# Patient Record
Sex: MALE | Race: WHITE | NOT HISPANIC OR LATINO
[De-identification: names, ages, dates, MRNs, and addresses within clinical notes are randomized per-mention and may not be internally consistent; named-entity substitution may affect disease eponyms.]

---

## 2019-04-26 PROBLEM — Z00.00 ENCOUNTER FOR PREVENTIVE HEALTH EXAMINATION: Status: ACTIVE | Noted: 2019-04-26

## 2019-05-02 ENCOUNTER — APPOINTMENT (OUTPATIENT)
Dept: BARIATRICS | Facility: CLINIC | Age: 39
End: 2019-05-02
Payer: COMMERCIAL

## 2019-05-02 VITALS
WEIGHT: 315 LBS | BODY MASS INDEX: 43.13 KG/M2 | OXYGEN SATURATION: 97 % | SYSTOLIC BLOOD PRESSURE: 130 MMHG | HEIGHT: 71.75 IN | HEART RATE: 97 BPM | DIASTOLIC BLOOD PRESSURE: 82 MMHG

## 2019-05-02 PROCEDURE — 99203 OFFICE O/P NEW LOW 30 MIN: CPT

## 2019-05-16 ENCOUNTER — APPOINTMENT (OUTPATIENT)
Dept: BARIATRICS | Facility: CLINIC | Age: 39
End: 2019-05-16

## 2019-05-16 VITALS — BODY MASS INDEX: 43.13 KG/M2 | HEIGHT: 71.75 IN | WEIGHT: 315 LBS

## 2019-05-20 ENCOUNTER — APPOINTMENT (OUTPATIENT)
Dept: BARIATRICS | Facility: CLINIC | Age: 39
End: 2019-05-20
Payer: COMMERCIAL

## 2019-05-20 VITALS — HEIGHT: 71.75 IN | WEIGHT: 315 LBS | BODY MASS INDEX: 43.13 KG/M2

## 2019-05-20 PROCEDURE — 97802 MEDICAL NUTRITION INDIV IN: CPT

## 2019-05-29 NOTE — ASSESSMENT
[FreeTextEntry1] : He meets the NIH criteria for bariatric surgery. He would like to have a laparoscopic modified duodenal switch. I have reviewed the risk and benefits of the procedure with the patient, and he understands this information fully.

## 2019-05-29 NOTE — HISTORY OF PRESENT ILLNESS
[de-identified] : Patient is a 38 year old male with a long history of morbid obesity not responsive to multiple dietary regimens. His BMI is 49.1

## 2019-06-03 ENCOUNTER — TRANSCRIPTION ENCOUNTER (OUTPATIENT)
Age: 39
End: 2019-06-03

## 2019-06-20 ENCOUNTER — APPOINTMENT (OUTPATIENT)
Dept: BARIATRICS | Facility: CLINIC | Age: 39
End: 2019-06-20
Payer: COMMERCIAL

## 2019-06-20 VITALS — WEIGHT: 315 LBS | BODY MASS INDEX: 43.13 KG/M2 | HEIGHT: 71.75 IN

## 2019-06-20 PROCEDURE — 97803 MED NUTRITION INDIV SUBSEQ: CPT

## 2019-06-24 ENCOUNTER — APPOINTMENT (OUTPATIENT)
Dept: RADIOLOGY | Facility: HOSPITAL | Age: 39
End: 2019-06-24

## 2019-07-02 ENCOUNTER — APPOINTMENT (OUTPATIENT)
Dept: BARIATRICS | Facility: CLINIC | Age: 39
End: 2019-07-02
Payer: COMMERCIAL

## 2019-07-02 VITALS
WEIGHT: 315 LBS | SYSTOLIC BLOOD PRESSURE: 129 MMHG | HEART RATE: 99 BPM | DIASTOLIC BLOOD PRESSURE: 87 MMHG | BODY MASS INDEX: 43.13 KG/M2 | OXYGEN SATURATION: 97 % | HEIGHT: 71.75 IN

## 2019-07-02 PROCEDURE — 99213 OFFICE O/P EST LOW 20 MIN: CPT

## 2019-07-02 NOTE — PHYSICAL EXAM
[Obese, well nourished, in no acute distress] : obese, well nourished, in no acute distress [Normal] : affect appropriate [de-identified] : inc c/d/i  [de-identified] : mmm, good color

## 2019-07-02 NOTE — HISTORY OF PRESENT ILLNESS
[de-identified] : 40 yo male presents for final visit for lap mod d/s.  has not lost weight.  feeling well.  no change in med hx.  nutrition quiz taken.  Denies fever, chills, n/v, cp, sob, abd pain, heartburn, diarrhea, constipation, lightheadedness, dizziness, calf pain, HA , hx of thrombosis

## 2019-07-08 VITALS
TEMPERATURE: 99 F | DIASTOLIC BLOOD PRESSURE: 78 MMHG | WEIGHT: 315 LBS | RESPIRATION RATE: 18 BRPM | HEART RATE: 78 BPM | SYSTOLIC BLOOD PRESSURE: 123 MMHG | OXYGEN SATURATION: 96 %

## 2019-07-08 NOTE — PATIENT PROFILE ADULT - ABILITY TO HEAR (WITH HEARING AID OR HEARING APPLIANCE IF NORMALLY USED):
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

## 2019-07-09 ENCOUNTER — RESULT REVIEW (OUTPATIENT)
Age: 39
End: 2019-07-09

## 2019-07-09 ENCOUNTER — INPATIENT (INPATIENT)
Facility: HOSPITAL | Age: 39
LOS: 2 days | Discharge: HOME | DRG: 621 | End: 2019-07-12
Attending: SURGERY | Admitting: SURGERY
Payer: COMMERCIAL

## 2019-07-09 ENCOUNTER — APPOINTMENT (OUTPATIENT)
Dept: BARIATRICS | Facility: HOSPITAL | Age: 39
End: 2019-07-09
Payer: COMMERCIAL

## 2019-07-09 DIAGNOSIS — Z41.9 ENCOUNTER FOR PROCEDURE FOR PURPOSES OTHER THAN REMEDYING HEALTH STATE, UNSPECIFIED: Chronic | ICD-10-CM

## 2019-07-09 LAB
HCT VFR BLD CALC: 51.8 % — HIGH (ref 39–50)
HGB BLD-MCNC: 16.7 G/DL — SIGNIFICANT CHANGE UP (ref 13–17)
MCHC RBC-ENTMCNC: 29.9 PG — SIGNIFICANT CHANGE UP (ref 27–34)
MCHC RBC-ENTMCNC: 32.2 GM/DL — SIGNIFICANT CHANGE UP (ref 32–36)
MCV RBC AUTO: 92.8 FL — SIGNIFICANT CHANGE UP (ref 80–100)
NRBC # BLD: 0 /100 WBCS — SIGNIFICANT CHANGE UP (ref 0–0)
PLATELET # BLD AUTO: 274 K/UL — SIGNIFICANT CHANGE UP (ref 150–400)
RBC # BLD: 5.58 M/UL — SIGNIFICANT CHANGE UP (ref 4.2–5.8)
RBC # FLD: 12.6 % — SIGNIFICANT CHANGE UP (ref 10.3–14.5)
WBC # BLD: 16.96 K/UL — HIGH (ref 3.8–10.5)
WBC # FLD AUTO: 16.96 K/UL — HIGH (ref 3.8–10.5)

## 2019-07-09 PROCEDURE — 43332 TRANSAB ESOPH HIAT HERN RPR: CPT | Mod: GC

## 2019-07-09 PROCEDURE — 43845 GASTROPLASTY DUODENAL SWITCH: CPT | Mod: GC

## 2019-07-09 RX ORDER — ENOXAPARIN SODIUM 100 MG/ML
40 INJECTION SUBCUTANEOUS ONCE
Refills: 0 | Status: COMPLETED | OUTPATIENT
Start: 2019-07-09 | End: 2019-07-09

## 2019-07-09 RX ORDER — HEPARIN SODIUM 5000 [USP'U]/ML
7500 INJECTION INTRAVENOUS; SUBCUTANEOUS EVERY 8 HOURS
Refills: 0 | Status: DISCONTINUED | OUTPATIENT
Start: 2019-07-09 | End: 2019-07-12

## 2019-07-09 RX ORDER — HEPARIN SODIUM 5000 [USP'U]/ML
5000 INJECTION INTRAVENOUS; SUBCUTANEOUS EVERY 8 HOURS
Refills: 0 | Status: DISCONTINUED | OUTPATIENT
Start: 2019-07-09 | End: 2019-07-09

## 2019-07-09 RX ORDER — ACETAMINOPHEN 500 MG
1000 TABLET ORAL ONCE
Refills: 0 | Status: COMPLETED | OUTPATIENT
Start: 2019-07-09 | End: 2019-07-09

## 2019-07-09 RX ORDER — OXYCODONE AND ACETAMINOPHEN 5; 325 MG/1; MG/1
1 TABLET ORAL EVERY 4 HOURS
Refills: 0 | Status: DISCONTINUED | OUTPATIENT
Start: 2019-07-09 | End: 2019-07-10

## 2019-07-09 RX ORDER — HYDROMORPHONE HYDROCHLORIDE 2 MG/ML
1 INJECTION INTRAMUSCULAR; INTRAVENOUS; SUBCUTANEOUS
Refills: 0 | Status: DISCONTINUED | OUTPATIENT
Start: 2019-07-09 | End: 2019-07-09

## 2019-07-09 RX ORDER — SCOPALAMINE 1 MG/3D
1 PATCH, EXTENDED RELEASE TRANSDERMAL ONCE
Refills: 0 | Status: COMPLETED | OUTPATIENT
Start: 2019-07-09 | End: 2019-07-09

## 2019-07-09 RX ORDER — KETOROLAC TROMETHAMINE 30 MG/ML
15 SYRINGE (ML) INJECTION EVERY 6 HOURS
Refills: 0 | Status: DISCONTINUED | OUTPATIENT
Start: 2019-07-09 | End: 2019-07-10

## 2019-07-09 RX ORDER — ONDANSETRON 8 MG/1
4 TABLET, FILM COATED ORAL EVERY 6 HOURS
Refills: 0 | Status: DISCONTINUED | OUTPATIENT
Start: 2019-07-09 | End: 2019-07-12

## 2019-07-09 RX ORDER — SIMETHICONE 80 MG/1
80 TABLET, CHEWABLE ORAL ONCE
Refills: 0 | Status: COMPLETED | OUTPATIENT
Start: 2019-07-09 | End: 2019-07-09

## 2019-07-09 RX ORDER — GABAPENTIN 400 MG/1
300 CAPSULE ORAL ONCE
Refills: 0 | Status: COMPLETED | OUTPATIENT
Start: 2019-07-09 | End: 2019-07-09

## 2019-07-09 RX ORDER — PANTOPRAZOLE SODIUM 20 MG/1
40 TABLET, DELAYED RELEASE ORAL DAILY
Refills: 0 | Status: DISCONTINUED | OUTPATIENT
Start: 2019-07-09 | End: 2019-07-12

## 2019-07-09 RX ORDER — SODIUM CHLORIDE 9 MG/ML
1000 INJECTION, SOLUTION INTRAVENOUS
Refills: 0 | Status: DISCONTINUED | OUTPATIENT
Start: 2019-07-09 | End: 2019-07-10

## 2019-07-09 RX ADMIN — ONDANSETRON 4 MILLIGRAM(S): 8 TABLET, FILM COATED ORAL at 21:18

## 2019-07-09 RX ADMIN — HEPARIN SODIUM 7500 UNIT(S): 5000 INJECTION INTRAVENOUS; SUBCUTANEOUS at 21:19

## 2019-07-09 RX ADMIN — SIMETHICONE 80 MILLIGRAM(S): 80 TABLET, CHEWABLE ORAL at 23:30

## 2019-07-09 RX ADMIN — SCOPALAMINE 1 PATCH: 1 PATCH, EXTENDED RELEASE TRANSDERMAL at 06:54

## 2019-07-09 RX ADMIN — Medication 15 MILLIGRAM(S): at 23:19

## 2019-07-09 RX ADMIN — Medication 15 MILLIGRAM(S): at 18:30

## 2019-07-09 RX ADMIN — Medication 1000 MILLIGRAM(S): at 07:22

## 2019-07-09 RX ADMIN — GABAPENTIN 300 MILLIGRAM(S): 400 CAPSULE ORAL at 07:22

## 2019-07-09 RX ADMIN — SCOPALAMINE 1 PATCH: 1 PATCH, EXTENDED RELEASE TRANSDERMAL at 18:43

## 2019-07-09 RX ADMIN — SODIUM CHLORIDE 200 MILLILITER(S): 9 INJECTION, SOLUTION INTRAVENOUS at 18:11

## 2019-07-09 RX ADMIN — ENOXAPARIN SODIUM 40 MILLIGRAM(S): 100 INJECTION SUBCUTANEOUS at 06:58

## 2019-07-09 RX ADMIN — Medication 15 MILLIGRAM(S): at 18:07

## 2019-07-09 NOTE — PROGRESS NOTE ADULT - SUBJECTIVE AND OBJECTIVE BOX
POST-OPERATIVE NOTE    Procedure:  Robotic Assisted Modified Duodenal Switch     Diagnosis/Indication: Morbid Obesity    Surgeon: Dr. Salinas    S: Pt has no complaints. Patient states he feels nauseous but denies vomiting. Denies CP, SOB, BOTELLO, calf tenderness. Pain controlled with medication. He desaturates to 88% when sleeping, currently on 3L NC    O:  T(C): 36.5 (07-09-19 @ 12:00), Max: 36.5 (07-09-19 @ 12:00)  T(F): 97.7 (07-09-19 @ 12:00), Max: 97.7 (07-09-19 @ 12:00)  HR: 86 (07-09-19 @ 13:44) (74 - 95)  BP: 107/58 (07-09-19 @ 13:44) (107/58 - 136/77)  RR: 20 (07-09-19 @ 13:44) (14 - 20)  SpO2: 95% (07-09-19 @ 13:44) (92% - 98%)  Wt(kg): --            Gen: NAD, resting comfortably in bed  C/V: NSR  Pulm: Nonlabored breathing, no respiratory distress, on 2L NC  Abd: soft, NT/ND  Extrem: WWP, no calf edema or tenderness, SCDs in place

## 2019-07-09 NOTE — BRIEF OPERATIVE NOTE - NSICDXBRIEFPROCEDURE_GEN_ALL_CORE_FT
PROCEDURES:  Robot-assisted duodenal switch 09-Jul-2019 11:47:18 modified, with hiatal hernia repair Lenard Otero S

## 2019-07-09 NOTE — BRIEF OPERATIVE NOTE - OPERATION/FINDINGS
Single anastomosis duodenal switch done with sleeve gastrectomy over 42 Fr bougie, staple line oversewn and fixed to omentum with running 2-0 PDS. Duodeno-ileostomy done at 300 cm from the terminal ileum with 2-0 PDS running sutures in 2 layers. Negative leak test. Hiatal hernia repaired with 2-0 Prolene.

## 2019-07-09 NOTE — PROGRESS NOTE ADULT - ASSESSMENT
A/P: 39y Male s/p Robotic Assisted Modified Duodenal Switch    -Pain/nausea control  -BCLD, IVF @ 200cc  -Protonix  -CBC 6hrs post-op   -HSQ  DVT ppx   -SCDs, OOB/A, IS  -AM labs  -Nutritional consult in AM  -CPAP

## 2019-07-10 ENCOUNTER — TRANSCRIPTION ENCOUNTER (OUTPATIENT)
Age: 39
End: 2019-07-10

## 2019-07-10 LAB
ANION GAP SERPL CALC-SCNC: 13 MMOL/L — SIGNIFICANT CHANGE UP (ref 5–17)
BUN SERPL-MCNC: 17 MG/DL — SIGNIFICANT CHANGE UP (ref 7–23)
CALCIUM SERPL-MCNC: 8.6 MG/DL — SIGNIFICANT CHANGE UP (ref 8.4–10.5)
CHLORIDE SERPL-SCNC: 103 MMOL/L — SIGNIFICANT CHANGE UP (ref 96–108)
CO2 SERPL-SCNC: 22 MMOL/L — SIGNIFICANT CHANGE UP (ref 22–31)
CREAT SERPL-MCNC: 1 MG/DL — SIGNIFICANT CHANGE UP (ref 0.5–1.3)
GLUCOSE SERPL-MCNC: 121 MG/DL — HIGH (ref 70–99)
HCT VFR BLD CALC: 46.5 % — SIGNIFICANT CHANGE UP (ref 39–50)
HGB BLD-MCNC: 15 G/DL — SIGNIFICANT CHANGE UP (ref 13–17)
MAGNESIUM SERPL-MCNC: 1.7 MG/DL — SIGNIFICANT CHANGE UP (ref 1.6–2.6)
MCHC RBC-ENTMCNC: 30.2 PG — SIGNIFICANT CHANGE UP (ref 27–34)
MCHC RBC-ENTMCNC: 32.3 GM/DL — SIGNIFICANT CHANGE UP (ref 32–36)
MCV RBC AUTO: 93.8 FL — SIGNIFICANT CHANGE UP (ref 80–100)
NRBC # BLD: 0 /100 WBCS — SIGNIFICANT CHANGE UP (ref 0–0)
PHOSPHATE SERPL-MCNC: 2.7 MG/DL — SIGNIFICANT CHANGE UP (ref 2.5–4.5)
PLATELET # BLD AUTO: 200 K/UL — SIGNIFICANT CHANGE UP (ref 150–400)
POTASSIUM SERPL-MCNC: 4 MMOL/L — SIGNIFICANT CHANGE UP (ref 3.5–5.3)
POTASSIUM SERPL-SCNC: 4 MMOL/L — SIGNIFICANT CHANGE UP (ref 3.5–5.3)
RBC # BLD: 4.96 M/UL — SIGNIFICANT CHANGE UP (ref 4.2–5.8)
RBC # FLD: 12.4 % — SIGNIFICANT CHANGE UP (ref 10.3–14.5)
SODIUM SERPL-SCNC: 138 MMOL/L — SIGNIFICANT CHANGE UP (ref 135–145)
SURGICAL PATHOLOGY STUDY: SIGNIFICANT CHANGE UP
WBC # BLD: 14.34 K/UL — HIGH (ref 3.8–10.5)
WBC # FLD AUTO: 14.34 K/UL — HIGH (ref 3.8–10.5)

## 2019-07-10 PROCEDURE — 43845 GASTROPLASTY DUODENAL SWITCH: CPT | Mod: GC

## 2019-07-10 PROCEDURE — 43332 TRANSAB ESOPH HIAT HERN RPR: CPT | Mod: GC

## 2019-07-10 RX ORDER — DEXAMETHASONE 0.5 MG/5ML
6 ELIXIR ORAL ONCE
Refills: 0 | Status: COMPLETED | OUTPATIENT
Start: 2019-07-10 | End: 2019-07-10

## 2019-07-10 RX ORDER — MAGNESIUM SULFATE 500 MG/ML
2 VIAL (ML) INJECTION ONCE
Refills: 0 | Status: COMPLETED | OUTPATIENT
Start: 2019-07-10 | End: 2019-07-10

## 2019-07-10 RX ORDER — METOCLOPRAMIDE HCL 10 MG
10 TABLET ORAL ONCE
Refills: 0 | Status: COMPLETED | OUTPATIENT
Start: 2019-07-10 | End: 2019-07-11

## 2019-07-10 RX ORDER — METOCLOPRAMIDE HCL 10 MG
10 TABLET ORAL ONCE
Refills: 0 | Status: COMPLETED | OUTPATIENT
Start: 2019-07-10 | End: 2019-07-10

## 2019-07-10 RX ORDER — ACETAMINOPHEN 500 MG
1000 TABLET ORAL ONCE
Refills: 0 | Status: DISCONTINUED | OUTPATIENT
Start: 2019-07-10 | End: 2019-07-12

## 2019-07-10 RX ORDER — SODIUM CHLORIDE 9 MG/ML
1000 INJECTION, SOLUTION INTRAVENOUS
Refills: 0 | Status: DISCONTINUED | OUTPATIENT
Start: 2019-07-10 | End: 2019-07-12

## 2019-07-10 RX ORDER — ACETAMINOPHEN 500 MG
1000 TABLET ORAL ONCE
Refills: 0 | Status: COMPLETED | OUTPATIENT
Start: 2019-07-10 | End: 2019-07-10

## 2019-07-10 RX ORDER — KETOROLAC TROMETHAMINE 30 MG/ML
30 SYRINGE (ML) INJECTION EVERY 6 HOURS
Refills: 0 | Status: DISCONTINUED | OUTPATIENT
Start: 2019-07-10 | End: 2019-07-12

## 2019-07-10 RX ORDER — HYDROMORPHONE HYDROCHLORIDE 2 MG/ML
0.5 INJECTION INTRAMUSCULAR; INTRAVENOUS; SUBCUTANEOUS ONCE
Refills: 0 | Status: DISCONTINUED | OUTPATIENT
Start: 2019-07-10 | End: 2019-07-10

## 2019-07-10 RX ADMIN — Medication 15 MILLIGRAM(S): at 12:50

## 2019-07-10 RX ADMIN — Medication 50 GRAM(S): at 09:58

## 2019-07-10 RX ADMIN — Medication 6 MILLIGRAM(S): at 12:31

## 2019-07-10 RX ADMIN — SCOPALAMINE 1 PATCH: 1 PATCH, EXTENDED RELEASE TRANSDERMAL at 18:56

## 2019-07-10 RX ADMIN — Medication 400 MILLIGRAM(S): at 18:41

## 2019-07-10 RX ADMIN — Medication 15 MILLIGRAM(S): at 05:58

## 2019-07-10 RX ADMIN — Medication 10 MILLIGRAM(S): at 05:42

## 2019-07-10 RX ADMIN — HEPARIN SODIUM 7500 UNIT(S): 5000 INJECTION INTRAVENOUS; SUBCUTANEOUS at 21:49

## 2019-07-10 RX ADMIN — HEPARIN SODIUM 7500 UNIT(S): 5000 INJECTION INTRAVENOUS; SUBCUTANEOUS at 05:35

## 2019-07-10 RX ADMIN — OXYCODONE AND ACETAMINOPHEN 1 TABLET(S): 5; 325 TABLET ORAL at 11:08

## 2019-07-10 RX ADMIN — Medication 30 MILLIGRAM(S): at 18:41

## 2019-07-10 RX ADMIN — HEPARIN SODIUM 7500 UNIT(S): 5000 INJECTION INTRAVENOUS; SUBCUTANEOUS at 14:20

## 2019-07-10 RX ADMIN — PANTOPRAZOLE SODIUM 40 MILLIGRAM(S): 20 TABLET, DELAYED RELEASE ORAL at 12:31

## 2019-07-10 RX ADMIN — ONDANSETRON 4 MILLIGRAM(S): 8 TABLET, FILM COATED ORAL at 11:00

## 2019-07-10 RX ADMIN — OXYCODONE AND ACETAMINOPHEN 1 TABLET(S): 5; 325 TABLET ORAL at 10:08

## 2019-07-10 RX ADMIN — ONDANSETRON 4 MILLIGRAM(S): 8 TABLET, FILM COATED ORAL at 18:41

## 2019-07-10 RX ADMIN — Medication 15 MILLIGRAM(S): at 12:32

## 2019-07-10 RX ADMIN — ONDANSETRON 4 MILLIGRAM(S): 8 TABLET, FILM COATED ORAL at 05:35

## 2019-07-10 RX ADMIN — Medication 30 MILLIGRAM(S): at 18:57

## 2019-07-10 RX ADMIN — HYDROMORPHONE HYDROCHLORIDE 0.5 MILLIGRAM(S): 2 INJECTION INTRAMUSCULAR; INTRAVENOUS; SUBCUTANEOUS at 21:50

## 2019-07-10 RX ADMIN — SODIUM CHLORIDE 100 MILLILITER(S): 9 INJECTION, SOLUTION INTRAVENOUS at 16:26

## 2019-07-10 RX ADMIN — SODIUM CHLORIDE 200 MILLILITER(S): 9 INJECTION, SOLUTION INTRAVENOUS at 10:00

## 2019-07-10 RX ADMIN — Medication 1000 MILLIGRAM(S): at 18:57

## 2019-07-10 RX ADMIN — SCOPALAMINE 1 PATCH: 1 PATCH, EXTENDED RELEASE TRANSDERMAL at 06:08

## 2019-07-10 RX ADMIN — Medication 15 MILLIGRAM(S): at 05:35

## 2019-07-10 RX ADMIN — Medication 15 MILLIGRAM(S): at 00:37

## 2019-07-10 RX ADMIN — HYDROMORPHONE HYDROCHLORIDE 0.5 MILLIGRAM(S): 2 INJECTION INTRAMUSCULAR; INTRAVENOUS; SUBCUTANEOUS at 22:25

## 2019-07-10 NOTE — DISCHARGE NOTE PROVIDER - CARE PROVIDER_API CALL
Grabiel Salinas (MD)  Surgery  [address]  [place], [place] [address]  Phone: [phone]  Fax: [phone]  Follow Up Time: 1 week

## 2019-07-10 NOTE — PROGRESS NOTE ADULT - SUBJECTIVE AND OBJECTIVE BOX
OVERNIGHT EVENTS: pain controlled, encouraged IS use and ambulating, OOBA, pt c/o of gas pain, post op h/h: 16.7/51.8, passed TOV  7/9: robotic assisted  MDS , poc wnl, VSS     STATUS POST:  Robotic assisted modified duodenal switch     POST OPERATIVE DAY #: 1    SUBJECTIVE: Patient examined bedside with chief resident. Patient complains of incisional and gas pain.  Patient complains of nausea and 2 episodes of emesis.  Patient denies SOB and chest pain . Patient making adequate urine output .    heparin  Injectable 7500 Unit(s) SubCutaneous every 8 hours      Vital Signs Last 24 Hrs  T(C): 36.6 (10 Jul 2019 08:30), Max: 37.2 (10 Jul 2019 05:47)  T(F): 97.8 (10 Jul 2019 08:30), Max: 99 (10 Jul 2019 05:47)  HR: 94 (10 Jul 2019 08:30) (70 - 116)  BP: 154/90 (10 Jul 2019 08:30) (107/58 - 154/90)  BP(mean): 78 (09 Jul 2019 13:44) (78 - 82)  RR: 18 (10 Jul 2019 08:30) (14 - 20)  SpO2: 97% (10 Jul 2019 08:30) (92% - 98%)  I&O's Detail    09 Jul 2019 07:01  -  10 Jul 2019 07:00  --------------------------------------------------------  IN:    lactated ringers.: 3000 mL  Total IN: 3000 mL    OUT:    Emesis: 75 mL    Voided: 500 mL  Total OUT: 575 mL    Total NET: 2425 mL          General: NAD, resting comfortably in bed  C/V: NSR  Pulm: Nonlabored breathing, no respiratory distress  Abd: soft, non distended, TTP around incision site, incision clean dry and intact  Extrem: WWP, no edema, SCDs in place        LABS:                        15.0   14.34 )-----------( 200      ( 10 Jul 2019 07:43 )             46.5     07-10    138  |  103  |  17  ----------------------------<  121<H>  4.0   |  22  |  1.00    Ca    8.6      10 Jul 2019 07:43  Phos  2.7     07-10  Mg     1.7     07-10            RADIOLOGY & ADDITIONAL STUDIES:

## 2019-07-10 NOTE — PROGRESS NOTE ADULT - ASSESSMENT
39 year old male with history of MO ( BMI  47) presents for elective procedure. Patient s/p laparoscopic modified duodenal switch     -Pain/nausea control  -BCLD, IVF @ 200cc  -Protonix  -HSQ  DVT ppx   -SCDs, OOB/A, IS  -AM labs  -Nutritional consult in AM

## 2019-07-10 NOTE — DISCHARGE NOTE PROVIDER - HOSPITAL COURSE
39 y ear old male  with history of morbid obesity - BMI 47, presents for elective bariatric surgery. 7/9: robotic assisted modified duodenal switch .Pt tolerated the procedure well.  At time of discharge, pt was tolerating a bariatric clear liquid diet, and pt's pain was controlled. Plan is to follow up with Dr. Salinas  in the office.

## 2019-07-10 NOTE — DISCHARGE NOTE PROVIDER - NSDCCPCAREPLAN_GEN_ALL_CORE_FT
PRINCIPAL DISCHARGE DIAGNOSIS  Diagnosis: Morbid obesity  Assessment and Plan of Treatment: 39 y ear old male  with history of morbid obesity - BMI 47, presents for elective bariatric surgery. 7/9: robotic assisted modified duodenal switch .  1) Please take Percocet 1 to 2 tabs by mouth every 4 to 6 hours as needed for severe pain control.  2) Please take Tylenol 650 mg every 4 to 6 hours by mouth for moderate pain control.   3) Please take Omeprazole 40 mg once a day by mouth.

## 2019-07-10 NOTE — DISCHARGE NOTE PROVIDER - NSDCCPGOAL_GEN_ALL_CORE_FT
To get better and follow your care plan as instructed.    1) Please take Percocet 1 to 2 tabs by mouth every 4 to 6 hours as needed for severe pain control.  2) Please take Tylenol 650 mg every 4 to 6 hours by mouth for moderate pain control.   3) Please take Omeprazole 40 mg once a day by mouth.

## 2019-07-10 NOTE — DISCHARGE NOTE PROVIDER - NSDCFUADDINST_GEN_ALL_CORE_FT
Follow up with  in 1 week. Call the office at  to schedule your appointment.  You may shower; soap and water over incision sites. Do not scrub. Pat dry when done. No tub bathing or swimming until cleared. Keep incision sites out of the sun as scars will darken. No heavy lifting (>10lbs) or strenuous exercise. Diet: Bariatric Full Fluids. 60 grams protein daily.  64 fluid ounces water daily. Drink small sips throughout the day. Continue diet as outlined by paperwork received as a pre-operative patient. You should be urinating at least 3-4x per day. Call the office if you experience increasing abdominal pain, nausea, vomiting, or temperature >100.4F.  NO ASPIRIN OR NSAIDs until approved by Dr. Salinas. Avoid alcoholic beverages until cleared by Dr. Salinas.   1) Please take Percocet 1 to 2 tabs by mouth every 4 to 6 hours as needed for severe pain control.  2) Please take Tylenol 650 mg every 4 to 6 hours by mouth for moderate pain control.   3) Please take Omeprazole 40 mg once a day by mouth.

## 2019-07-11 LAB
ANION GAP SERPL CALC-SCNC: 10 MMOL/L — SIGNIFICANT CHANGE UP (ref 5–17)
BUN SERPL-MCNC: 16 MG/DL — SIGNIFICANT CHANGE UP (ref 7–23)
CALCIUM SERPL-MCNC: 8.9 MG/DL — SIGNIFICANT CHANGE UP (ref 8.4–10.5)
CHLORIDE SERPL-SCNC: 105 MMOL/L — SIGNIFICANT CHANGE UP (ref 96–108)
CO2 SERPL-SCNC: 25 MMOL/L — SIGNIFICANT CHANGE UP (ref 22–31)
CREAT SERPL-MCNC: 0.93 MG/DL — SIGNIFICANT CHANGE UP (ref 0.5–1.3)
GLUCOSE SERPL-MCNC: 99 MG/DL — SIGNIFICANT CHANGE UP (ref 70–99)
HCT VFR BLD CALC: 42.4 % — SIGNIFICANT CHANGE UP (ref 39–50)
HGB BLD-MCNC: 13.6 G/DL — SIGNIFICANT CHANGE UP (ref 13–17)
MAGNESIUM SERPL-MCNC: 2 MG/DL — SIGNIFICANT CHANGE UP (ref 1.6–2.6)
MCHC RBC-ENTMCNC: 30.1 PG — SIGNIFICANT CHANGE UP (ref 27–34)
MCHC RBC-ENTMCNC: 32.1 GM/DL — SIGNIFICANT CHANGE UP (ref 32–36)
MCV RBC AUTO: 93.8 FL — SIGNIFICANT CHANGE UP (ref 80–100)
NRBC # BLD: 0 /100 WBCS — SIGNIFICANT CHANGE UP (ref 0–0)
PHOSPHATE SERPL-MCNC: 2.3 MG/DL — LOW (ref 2.5–4.5)
PLATELET # BLD AUTO: 197 K/UL — SIGNIFICANT CHANGE UP (ref 150–400)
POTASSIUM SERPL-MCNC: 4 MMOL/L — SIGNIFICANT CHANGE UP (ref 3.5–5.3)
POTASSIUM SERPL-SCNC: 4 MMOL/L — SIGNIFICANT CHANGE UP (ref 3.5–5.3)
RBC # BLD: 4.52 M/UL — SIGNIFICANT CHANGE UP (ref 4.2–5.8)
RBC # FLD: 12.4 % — SIGNIFICANT CHANGE UP (ref 10.3–14.5)
SODIUM SERPL-SCNC: 140 MMOL/L — SIGNIFICANT CHANGE UP (ref 135–145)
WBC # BLD: 11.05 K/UL — HIGH (ref 3.8–10.5)
WBC # FLD AUTO: 11.05 K/UL — HIGH (ref 3.8–10.5)

## 2019-07-11 RX ORDER — MAGNESIUM SULFATE 500 MG/ML
2 VIAL (ML) INJECTION ONCE
Refills: 0 | Status: COMPLETED | OUTPATIENT
Start: 2019-07-11 | End: 2019-07-11

## 2019-07-11 RX ORDER — DEXAMETHASONE 0.5 MG/5ML
6 ELIXIR ORAL ONCE
Refills: 0 | Status: COMPLETED | OUTPATIENT
Start: 2019-07-11 | End: 2019-07-11

## 2019-07-11 RX ORDER — DIPHENHYDRAMINE HCL 50 MG
25 CAPSULE ORAL ONCE
Refills: 0 | Status: COMPLETED | OUTPATIENT
Start: 2019-07-11 | End: 2019-07-11

## 2019-07-11 RX ORDER — HYDROMORPHONE HYDROCHLORIDE 2 MG/ML
0.5 INJECTION INTRAMUSCULAR; INTRAVENOUS; SUBCUTANEOUS ONCE
Refills: 0 | Status: DISCONTINUED | OUTPATIENT
Start: 2019-07-11 | End: 2019-07-11

## 2019-07-11 RX ADMIN — Medication 30 MILLIGRAM(S): at 17:38

## 2019-07-11 RX ADMIN — Medication 30 MILLIGRAM(S): at 00:37

## 2019-07-11 RX ADMIN — SCOPALAMINE 1 PATCH: 1 PATCH, EXTENDED RELEASE TRANSDERMAL at 19:05

## 2019-07-11 RX ADMIN — HEPARIN SODIUM 7500 UNIT(S): 5000 INJECTION INTRAVENOUS; SUBCUTANEOUS at 21:21

## 2019-07-11 RX ADMIN — Medication 50 GRAM(S): at 07:49

## 2019-07-11 RX ADMIN — Medication 30 MILLIGRAM(S): at 12:18

## 2019-07-11 RX ADMIN — Medication 30 MILLIGRAM(S): at 06:25

## 2019-07-11 RX ADMIN — HYDROMORPHONE HYDROCHLORIDE 0.5 MILLIGRAM(S): 2 INJECTION INTRAMUSCULAR; INTRAVENOUS; SUBCUTANEOUS at 02:39

## 2019-07-11 RX ADMIN — Medication 30 MILLIGRAM(S): at 17:23

## 2019-07-11 RX ADMIN — Medication 6 MILLIGRAM(S): at 09:26

## 2019-07-11 RX ADMIN — HYDROMORPHONE HYDROCHLORIDE 0.5 MILLIGRAM(S): 2 INJECTION INTRAMUSCULAR; INTRAVENOUS; SUBCUTANEOUS at 02:24

## 2019-07-11 RX ADMIN — Medication 30 MILLIGRAM(S): at 01:00

## 2019-07-11 RX ADMIN — Medication 25 MILLIGRAM(S): at 23:20

## 2019-07-11 RX ADMIN — Medication 30 MILLIGRAM(S): at 05:53

## 2019-07-11 RX ADMIN — ONDANSETRON 4 MILLIGRAM(S): 8 TABLET, FILM COATED ORAL at 02:24

## 2019-07-11 RX ADMIN — HEPARIN SODIUM 7500 UNIT(S): 5000 INJECTION INTRAVENOUS; SUBCUTANEOUS at 14:08

## 2019-07-11 RX ADMIN — Medication 30 MILLIGRAM(S): at 12:03

## 2019-07-11 RX ADMIN — SCOPALAMINE 1 PATCH: 1 PATCH, EXTENDED RELEASE TRANSDERMAL at 06:25

## 2019-07-11 RX ADMIN — HEPARIN SODIUM 7500 UNIT(S): 5000 INJECTION INTRAVENOUS; SUBCUTANEOUS at 05:53

## 2019-07-11 RX ADMIN — Medication 30 MILLIGRAM(S): at 23:20

## 2019-07-11 RX ADMIN — PANTOPRAZOLE SODIUM 40 MILLIGRAM(S): 20 TABLET, DELAYED RELEASE ORAL at 12:03

## 2019-07-11 RX ADMIN — Medication 10 MILLIGRAM(S): at 03:45

## 2019-07-11 NOTE — PROGRESS NOTE ADULT - ASSESSMENT
39 year old male with history of MO POD 2 s/p Lap modified duodenal switch.     -Pain/nausea control  -BCLD, IVF @ 200cc  -Protonix  -HSQ  DVT ppx   -SCDs, OOB/A, IS  -AM labs  -Nutritional consult in AM  -Decadron for nausea

## 2019-07-11 NOTE — PROGRESS NOTE ADULT - SUBJECTIVE AND OBJECTIVE BOX
STATUS POST:  Lap Modified Duodenal Switch     SUBJECTIVE: 39 year old male with history of MO POD 2 s/p Lap modified duodenal switch. This morning, he does not feel well. He had one episode of emesis, not tolerating PO intake, and complains of sensation of liquids getting stuck down his throat. Tachycardic overnight. His pain is well-controlled. Passing flatus and having BMs. No acute complaints.     heparin  Injectable 7500 Unit(s) SubCutaneous every 8 hours      Vital Signs Last 24 Hrs  T(C): 36.9 (11 Jul 2019 05:55), Max: 37.2 (10 Jul 2019 21:17)  T(F): 98.5 (11 Jul 2019 05:55), Max: 99 (10 Jul 2019 21:17)  HR: 84 (11 Jul 2019 05:55) (84 - 106)  BP: 144/91 (11 Jul 2019 05:55) (143/106 - 163/104)  BP(mean): --  RR: 18 (11 Jul 2019 05:55) (18 - 19)  SpO2: 95% (11 Jul 2019 05:55) (95% - 97%)  I&O's Detail    10 Jul 2019 07:01  -  11 Jul 2019 07:00  --------------------------------------------------------  IN:    IV PiggyBack: 50 mL    lactated ringers.: 2400 mL    Oral Fluid: 710 mL  Total IN: 3160 mL    OUT:  Total OUT: 0 mL    Total NET: 3160 mL          General: NAD, resting comfortably in bed.  Pulm: Nonlabored breathing, no respiratory distress  Abd: soft, NT/ND.   Extrem: WWP, no edema, no calf tenderness        LABS:                        15.0   14.34 )-----------( 200      ( 10 Jul 2019 07:43 )             46.5     07-10    138  |  103  |  17  ----------------------------<  121<H>  4.0   |  22  |  1.00    Ca    8.6      10 Jul 2019 07:43  Phos  2.7     07-10  Mg     1.7     07-10

## 2019-07-12 ENCOUNTER — TRANSCRIPTION ENCOUNTER (OUTPATIENT)
Age: 39
End: 2019-07-12

## 2019-07-12 VITALS
OXYGEN SATURATION: 98 % | TEMPERATURE: 97 F | RESPIRATION RATE: 16 BRPM | DIASTOLIC BLOOD PRESSURE: 73 MMHG | HEART RATE: 64 BPM | SYSTOLIC BLOOD PRESSURE: 123 MMHG

## 2019-07-12 LAB
ANION GAP SERPL CALC-SCNC: 12 MMOL/L — SIGNIFICANT CHANGE UP (ref 5–17)
BUN SERPL-MCNC: 18 MG/DL — SIGNIFICANT CHANGE UP (ref 7–23)
CALCIUM SERPL-MCNC: 8.6 MG/DL — SIGNIFICANT CHANGE UP (ref 8.4–10.5)
CHLORIDE SERPL-SCNC: 104 MMOL/L — SIGNIFICANT CHANGE UP (ref 96–108)
CO2 SERPL-SCNC: 22 MMOL/L — SIGNIFICANT CHANGE UP (ref 22–31)
CREAT SERPL-MCNC: 0.85 MG/DL — SIGNIFICANT CHANGE UP (ref 0.5–1.3)
GLUCOSE SERPL-MCNC: 85 MG/DL — SIGNIFICANT CHANGE UP (ref 70–99)
HCT VFR BLD CALC: 41.7 % — SIGNIFICANT CHANGE UP (ref 39–50)
HGB BLD-MCNC: 13.5 G/DL — SIGNIFICANT CHANGE UP (ref 13–17)
MAGNESIUM SERPL-MCNC: 2 MG/DL — SIGNIFICANT CHANGE UP (ref 1.6–2.6)
MCHC RBC-ENTMCNC: 30.3 PG — SIGNIFICANT CHANGE UP (ref 27–34)
MCHC RBC-ENTMCNC: 32.4 GM/DL — SIGNIFICANT CHANGE UP (ref 32–36)
MCV RBC AUTO: 93.5 FL — SIGNIFICANT CHANGE UP (ref 80–100)
NRBC # BLD: 0 /100 WBCS — SIGNIFICANT CHANGE UP (ref 0–0)
PHOSPHATE SERPL-MCNC: 3 MG/DL — SIGNIFICANT CHANGE UP (ref 2.5–4.5)
PLATELET # BLD AUTO: 178 K/UL — SIGNIFICANT CHANGE UP (ref 150–400)
POTASSIUM SERPL-MCNC: 4 MMOL/L — SIGNIFICANT CHANGE UP (ref 3.5–5.3)
POTASSIUM SERPL-SCNC: 4 MMOL/L — SIGNIFICANT CHANGE UP (ref 3.5–5.3)
RBC # BLD: 4.46 M/UL — SIGNIFICANT CHANGE UP (ref 4.2–5.8)
RBC # FLD: 12.2 % — SIGNIFICANT CHANGE UP (ref 10.3–14.5)
SODIUM SERPL-SCNC: 138 MMOL/L — SIGNIFICANT CHANGE UP (ref 135–145)
WBC # BLD: 10.16 K/UL — SIGNIFICANT CHANGE UP (ref 3.8–10.5)
WBC # FLD AUTO: 10.16 K/UL — SIGNIFICANT CHANGE UP (ref 3.8–10.5)

## 2019-07-12 PROCEDURE — 94660 CPAP INITIATION&MGMT: CPT

## 2019-07-12 PROCEDURE — 83735 ASSAY OF MAGNESIUM: CPT

## 2019-07-12 PROCEDURE — 86850 RBC ANTIBODY SCREEN: CPT

## 2019-07-12 PROCEDURE — 86900 BLOOD TYPING SEROLOGIC ABO: CPT

## 2019-07-12 PROCEDURE — 36415 COLL VENOUS BLD VENIPUNCTURE: CPT

## 2019-07-12 PROCEDURE — 88307 TISSUE EXAM BY PATHOLOGIST: CPT

## 2019-07-12 PROCEDURE — 88341 IMHCHEM/IMCYTCHM EA ADD ANTB: CPT

## 2019-07-12 PROCEDURE — 84100 ASSAY OF PHOSPHORUS: CPT

## 2019-07-12 PROCEDURE — 85027 COMPLETE CBC AUTOMATED: CPT

## 2019-07-12 PROCEDURE — 86901 BLOOD TYPING SEROLOGIC RH(D): CPT

## 2019-07-12 PROCEDURE — 80048 BASIC METABOLIC PNL TOTAL CA: CPT

## 2019-07-12 PROCEDURE — S2900: CPT

## 2019-07-12 RX ORDER — OMEPRAZOLE 10 MG/1
1 CAPSULE, DELAYED RELEASE ORAL
Qty: 30 | Refills: 0
Start: 2019-07-12 | End: 2019-08-10

## 2019-07-12 RX ADMIN — Medication 30 MILLIGRAM(S): at 00:00

## 2019-07-12 RX ADMIN — Medication 30 MILLIGRAM(S): at 11:00

## 2019-07-12 RX ADMIN — Medication 30 MILLIGRAM(S): at 06:00

## 2019-07-12 RX ADMIN — SCOPALAMINE 1 PATCH: 1 PATCH, EXTENDED RELEASE TRANSDERMAL at 06:56

## 2019-07-12 RX ADMIN — HEPARIN SODIUM 7500 UNIT(S): 5000 INJECTION INTRAVENOUS; SUBCUTANEOUS at 05:12

## 2019-07-12 RX ADMIN — PANTOPRAZOLE SODIUM 40 MILLIGRAM(S): 20 TABLET, DELAYED RELEASE ORAL at 11:00

## 2019-07-12 RX ADMIN — Medication 30 MILLIGRAM(S): at 05:12

## 2019-07-12 NOTE — PROGRESS NOTE ADULT - SUBJECTIVE AND OBJECTIVE BOX
STATUS POST:  Robot Assisted Sleeve Gastrectomy     SUBJECTIVE: 40 y/o man POD 3 s/p Robot Assisted Sleeve Gastrectomy. This morning, he feels well; his nausea has improved, no longer vomiting. His pain is well-controlled. Passing flatus but no BMs. No acute complaints.     heparin  Injectable 7500 Unit(s) SubCutaneous every 8 hours      Vital Signs Last 24 Hrs  T(C): 36.2 (12 Jul 2019 06:18), Max: 37 (11 Jul 2019 12:14)  T(F): 97.2 (12 Jul 2019 06:18), Max: 98.6 (11 Jul 2019 12:14)  HR: 74 (12 Jul 2019 06:18) (74 - 97)  BP: 127/80 (12 Jul 2019 06:18) (127/79 - 133/78)  BP(mean): --  RR: 74 (12 Jul 2019 06:18) (17 - 74)  SpO2: 97% (12 Jul 2019 06:18) (95% - 100%)  I&O's Detail    11 Jul 2019 07:01  -  12 Jul 2019 07:00  --------------------------------------------------------  IN:    lactated ringers.: 900 mL    Oral Fluid: 720 mL  Total IN: 1620 mL    OUT:    Voided: 2000 mL  Total OUT: 2000 mL    Total NET: -380 mL          General: NAD, resting comfortably in bed  C/V: NSR  Pulm: Nonlabored breathing, no respiratory distress  Abd: soft, NT/ND.  Extrem: WWP, no edema, SCDs in place, no calf tenderness        LABS:                        13.5   10.16 )-----------( 178      ( 12 Jul 2019 07:05 )             41.7     07-11    140  |  105  |  16  ----------------------------<  99  4.0   |  25  |  0.93    Ca    8.9      11 Jul 2019 08:25  Phos  2.3     07-11  Mg     2.0     07-11

## 2019-07-12 NOTE — DISCHARGE NOTE NURSING/CASE MANAGEMENT/SOCIAL WORK - NSDCDPATPORTLINK_GEN_ALL_CORE
You can access the [facility] Portal, offered by [facility], by registering with the following website: [email or web address]

## 2019-07-12 NOTE — PROGRESS NOTE ADULT - ASSESSMENT
39 year old male with history of MO ( BMI  47) presents for elective procedure. Patient s/p laparoscopic modified duodenal switch. Improving post-op. Discharge today.     -Pain/nausea control  -BCLD, IVF @ 100cc  -Protonix  -HSQ  DVT ppx   -SCDs, OOB/A, IS  -AM labs  -Nutritional consult in AM

## 2019-07-12 NOTE — DISCHARGE NOTE NURSING/CASE MANAGEMENT/SOCIAL WORK - NSDCPNINST_GEN_ALL_CORE
Follow progression of diet as you were instructed by Dr. Salinas. Call Dr. Salinas if you have any questions or concerns. Educational material given: [employer] [facility] Patient Education: Surgical Wound Discharge Instructions.

## 2019-07-12 NOTE — DISCHARGE NOTE NURSING/CASE MANAGEMENT/SOCIAL WORK - NSDCPNDISPN_GEN_ALL_CORE
Activities of daily living, including home environment that might     exacerbate pain or reduce effectiveness of the pain management plan of care as well as strategies to address these issues/Education provided on the pain management plan of care/Safe use, storage and disposal of opioids when prescribed/Side effects of pain management treatment

## 2019-07-15 ENCOUNTER — OTHER (OUTPATIENT)
Age: 39
End: 2019-07-15

## 2019-07-15 DIAGNOSIS — E66.9 OBESITY, UNSPECIFIED: ICD-10-CM

## 2019-07-15 DIAGNOSIS — K44.9 DIAPHRAGMATIC HERNIA WITHOUT OBSTRUCTION OR GANGRENE: ICD-10-CM

## 2019-07-15 DIAGNOSIS — Z98.890 OTHER SPECIFIED POSTPROCEDURAL STATES: ICD-10-CM

## 2019-07-15 DIAGNOSIS — H91.90 UNSPECIFIED HEARING LOSS, UNSPECIFIED EAR: ICD-10-CM

## 2019-07-15 DIAGNOSIS — E66.01 MORBID (SEVERE) OBESITY DUE TO EXCESS CALORIES: ICD-10-CM

## 2019-07-23 ENCOUNTER — APPOINTMENT (OUTPATIENT)
Dept: BARIATRICS | Facility: CLINIC | Age: 39
End: 2019-07-23
Payer: COMMERCIAL

## 2019-07-23 VITALS
TEMPERATURE: 97.4 F | WEIGHT: 315 LBS | HEART RATE: 84 BPM | HEIGHT: 71.75 IN | DIASTOLIC BLOOD PRESSURE: 88 MMHG | BODY MASS INDEX: 43.13 KG/M2 | OXYGEN SATURATION: 97 % | SYSTOLIC BLOOD PRESSURE: 123 MMHG

## 2019-07-23 PROBLEM — Z78.9 OTHER SPECIFIED HEALTH STATUS: Chronic | Status: ACTIVE | Noted: 2019-07-08

## 2019-07-23 PROCEDURE — 99024 POSTOP FOLLOW-UP VISIT: CPT

## 2019-07-23 NOTE — PHYSICAL EXAM
[Obese, well nourished, in no acute distress] : obese, well nourished, in no acute distress [Normal] : affect appropriate [de-identified] : mmm, good color  [de-identified] : neg wong and homanns b/l

## 2019-08-20 ENCOUNTER — APPOINTMENT (OUTPATIENT)
Dept: BARIATRICS | Facility: CLINIC | Age: 39
End: 2019-08-20

## 2019-08-27 ENCOUNTER — OTHER (OUTPATIENT)
Age: 39
End: 2019-08-27

## 2019-09-10 ENCOUNTER — APPOINTMENT (OUTPATIENT)
Dept: BARIATRICS | Facility: CLINIC | Age: 39
End: 2019-09-10

## 2019-10-02 ENCOUNTER — APPOINTMENT (OUTPATIENT)
Dept: BARIATRICS | Facility: CLINIC | Age: 39
End: 2019-10-02
Payer: COMMERCIAL

## 2019-10-02 VITALS
HEART RATE: 74 BPM | SYSTOLIC BLOOD PRESSURE: 120 MMHG | BODY MASS INDEX: 37.79 KG/M2 | WEIGHT: 276 LBS | DIASTOLIC BLOOD PRESSURE: 83 MMHG | TEMPERATURE: 96.6 F | HEIGHT: 71.75 IN | OXYGEN SATURATION: 98 %

## 2019-10-02 DIAGNOSIS — Z09 ENCOUNTER FOR FOLLOW-UP EXAMINATION AFTER COMPLETED TREATMENT FOR CONDITIONS OTHER THAN MALIGNANT NEOPLASM: ICD-10-CM

## 2019-10-02 DIAGNOSIS — E66.01 MORBID (SEVERE) OBESITY DUE TO EXCESS CALORIES: ICD-10-CM

## 2019-10-02 DIAGNOSIS — E46 UNSPECIFIED PROTEIN-CALORIE MALNUTRITION: ICD-10-CM

## 2019-10-02 PROCEDURE — 99024 POSTOP FOLLOW-UP VISIT: CPT

## 2019-10-24 ENCOUNTER — OTHER (OUTPATIENT)
Age: 39
End: 2019-10-24

## 2019-12-04 NOTE — HISTORY OF PRESENT ILLNESS
[de-identified] : Patient is doing well and has no complaints. He is eating a proper diet, taking his vitamins and exercising.

## 2020-01-09 ENCOUNTER — APPOINTMENT (OUTPATIENT)
Dept: BARIATRICS | Facility: CLINIC | Age: 40
End: 2020-01-09

## 2020-02-03 ENCOUNTER — RX RENEWAL (OUTPATIENT)
Age: 40
End: 2020-02-03

## 2020-06-11 ENCOUNTER — APPOINTMENT (OUTPATIENT)
Dept: BARIATRICS | Facility: CLINIC | Age: 40
End: 2020-06-11
Payer: COMMERCIAL

## 2020-06-11 DIAGNOSIS — Z98.84 BARIATRIC SURGERY STATUS: ICD-10-CM

## 2020-06-11 PROCEDURE — 99213 OFFICE O/P EST LOW 20 MIN: CPT | Mod: 95

## 2020-07-26 PROBLEM — Z98.84 S/P BARIATRIC SURGERY: Status: ACTIVE | Noted: 2019-10-02

## 2020-07-26 NOTE — HISTORY OF PRESENT ILLNESS
[Other Location: e.g. Home (Enter Location, City,State)___] : at [unfilled] [Home] : at home, [unfilled] , at the time of the visit. [de-identified] : Patient is doing well and has no complaints. He is eating a proper diet and taking his vitamins. He is not exercising very much which explains why his weight loss is slowing down.  [Verbal consent obtained from patient] : the patient, [unfilled]

## 2021-09-03 NOTE — DISCHARGE NOTE NURSING/CASE MANAGEMENT/SOCIAL WORK - NSCORESITESY/N_GEN_A_CORE_RD
Occupational Therapy  Visit Type: treatment  Co-treat with: Physical therapist (DARRYL Carter)  Precautions:  Medical precautions:  fall risk; standard precautions.    Lines:     Basic: capped IV and telemetry      Lines in chart and on patient reviewed, precautions maintained throughout session.  Vision:     Current vision: wears glasses all the time  Safety Measures: bed alarm and chair alarm    SUBJECTIVE  Patient agreed to participate in therapy this date.  Patient / Family Goal: return home    Pain     Location: no complaints    OBJECTIVE   Pulse Ox: 98  Blood Pressure: 162/95  Position: supine  Heart Rate: 61    153/100  eob   153/102 eob post donning socks  123/91 standing in front of chair at bs  127/92 post amb to window and back to bs chairLevel of consciousness: alert      Affect/Behavior: alert, appropriate, calm, cooperative and pleasant  Patient activity tolerance: 1 to 1 activity to rest  Functional Communication/Cognition    Overall status:  Within functional limits    Form of communication:  Verbal    Commands: follows one step commands with increased time.    Transition between tasks: transition with cues.    Awareness of deficits: decreased awareness of deficits.  Bed mobility:    Rolling left: supervision    Side-lying to sit: supervision    Sit to side-lying: supervision  Training completed:      Education details: patient safety  Transfers:    Assistive devices: gait belt, 2-wheeled walker and 2 person (2nd person for safety)    Sit to stand: contact guard/touching/steadying assist    Stand to sit: contact guard/touching/steadying assist    Training completed:      Education details: patient safety  Functional Ambulation:    Assistance:minimal assist (pt lost balance x1 but righted self during ambulation to window of pt room)   Assistive device:gait belt and 2-wheeled walker    Distance (ft): 5;5    Surface: even      Education details: patient safety  Activities of Daily Living  (ADLs):  Grooming/Oral Hygiene:     Grooming assist: set up (provided warm washcloth for facial hygiene)    Position: edge of bed    Assist needed for: set up  Lower Body Dressing:     Footwear assistance: set up (provided socks on bed; pt demonstrated good carry over to don socks by raising LE on bed and trunck turned to the side)    Footwear position: edge of bed    Assist needed for: set up and supervision/safety      Interventions    Training provided: ADL training, activity tolerance, compensatory techniques, functional ambulation and transfer trainingRehab Safety  Therapist donned the following PPE for this patient encounter:  Gloves and Surgical Mask    Therapy aide or other healthcare professional present during this patient encounter:   Yes, for approx 33 minutes and was < 5' from the patient.  If yes, that healthcare professional wore the following PPE: Same as therapist PPE noted above    The patient was wearing a mask during this session:  No      Skilled input: verbal instruction/cues  Verbal Consent: Writer verbally educated and received verbal consent for hand placement, positioning of patient, and techniques to be performed today from patient for clothing adjustments for techniques and therapist position for techniques as described above and how they are pertinent to the patient's plan of care.        ASSESSMENT    Impairments: activity tolerance, decreased insight into deficits, safety awareness and strength  Functional Limitations: functional mobility, eating, grooming, bathing, toileting, functional transfers, dressing and showering     Discharge Recommendations:   Recommendations for Discharge: OT IL: Patient is appropriate for intensive daily Occupational Therapy with the oversight of a Physical Medicine and Rehabilitation physician               Skilled therapy is required to address these limitations in attempt to maximize the patient's independence.  Progress: slow progress, medical status  limitations    End of Session:   Location: in chair  Safety measures: alarm system in place/re-engaged, call light within reach, equipment intact and lines intact  Handoff to: nurse    PLAN  Suggestions for next session as indicated: OT Frequency: 5 days/week  Frequency Comments: 09/03 [facility]  N-1/5 Evi BREWER    Interventions: activity tolerance training, ADL retraining, compensatory techniques, functional transfer training and patient education  Agreement to plan and goals: patient agrees with goals and treatment plan      GOALS  Long Term Goals: (to be met by time of discharge from hospital)  Grooming: Patient will complete grooming tasks moderate assist. Status: progressing/ongoing  Upper body dressing: Patient will complete upper body dressing moderate assist. Status: met   Lower body dressing: Patient will complete lower body dressing maximal assist. Status: met  (supervision after set/up 09/03)  Toileting: Patient will complete toileting maximal assist.  Bathing: Patient will complete bathingmaximal assist Toilet transfer: Patient will complete toilet transfer with gait belt and 2-wheeled walker, moderate assist.  Status: progressing/ongoing (orthostatic sit to stand transfers, then ambulation to pt window and back to bs chair)        Documented in the chart in the following areas: Assessment. Plan.      Therapy procedure time and total treatment time can be found documented on the Time Entry flowsheet   No

## 2022-02-08 ENCOUNTER — NON-APPOINTMENT (OUTPATIENT)
Age: 42
End: 2022-02-08

## 2022-07-28 ENCOUNTER — APPOINTMENT (OUTPATIENT)
Dept: OTOLARYNGOLOGY | Facility: CLINIC | Age: 42
End: 2022-07-28

## 2022-07-28 VITALS — TEMPERATURE: 97.7 F | HEIGHT: 73 IN | WEIGHT: 200 LBS | BODY MASS INDEX: 26.51 KG/M2

## 2022-07-28 DIAGNOSIS — Z78.9 OTHER SPECIFIED HEALTH STATUS: ICD-10-CM

## 2022-07-28 DIAGNOSIS — H90.8 MIXED CONDUCTIVE AND SENSORINEURAL HEARING LOSS, UNSPECIFIED: ICD-10-CM

## 2022-07-28 DIAGNOSIS — H90.6 MIXED CONDUCTIVE AND SENSORINEURAL HEARING LOSS, BILATERAL: ICD-10-CM

## 2022-07-28 PROCEDURE — 92567 TYMPANOMETRY: CPT

## 2022-07-28 PROCEDURE — 99204 OFFICE O/P NEW MOD 45 MIN: CPT

## 2022-07-28 PROCEDURE — 92557 COMPREHENSIVE HEARING TEST: CPT

## 2022-07-28 PROCEDURE — 92504 EAR MICROSCOPY EXAMINATION: CPT

## 2022-07-28 RX ORDER — FAMOTIDINE 40 MG/1
40 TABLET, FILM COATED ORAL
Qty: 30 | Refills: 0 | Status: DISCONTINUED | COMMUNITY
Start: 2019-10-02 | End: 2022-07-28

## 2022-07-28 RX ORDER — OMEPRAZOLE 20 MG/1
20 CAPSULE, DELAYED RELEASE ORAL DAILY
Qty: 30 | Refills: 0 | Status: DISCONTINUED | COMMUNITY
Start: 2019-08-27 | End: 2022-07-28

## 2022-07-28 RX ORDER — FAMOTIDINE 20 MG/1
20 TABLET, FILM COATED ORAL
Qty: 30 | Refills: 0 | Status: DISCONTINUED | COMMUNITY
Start: 2019-10-24 | End: 2022-07-28

## 2022-07-28 NOTE — DATA REVIEWED
[de-identified] : Complete audiometry was ordered and completed today. I have interpreted these results and reviewed them in detail with the patient.\par \par severe to profoundmixed hearing loss in the left ear greater than the right.

## 2022-07-28 NOTE — HISTORY OF PRESENT ILLNESS
[de-identified] : OLIVA MAGANA has a history of hearing loss since he was 14 years old.  This has apparently worsened over time. Underwent left ear surgery in 2012 but was aborted due to a thick footplate. Using hearing aids for many years. \par Tinnitus is noted bilaterally.  No vertigo. \par FH: uncle has similar hearing loss

## 2022-07-28 NOTE — ASSESSMENT
[FreeTextEntry1] : severe to profound mixed hearing loss affecting the left greater than the right. 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 We discussed management options.\par \par I have recommended CT scan evaluation of the temporal bone.We discussed the potential option for revision surgery.All risks limitations, complications and alternatives reviewed in detail.  Questions answered.\par \par live or virtual followup recommended after CT imaging.

## 2022-07-28 NOTE — PHYSICAL EXAM
[FreeTextEntry1] : Procedure: Microscopic Ear Exam\par \par Left ear:  Ear canal intact without inflammation or lesion.  \par Tympanic membrane intact without inflammation.\par \par Right ear:  Ear canal intact without inflammation or lesion.  \par Tympanic membrane intact without inflammation.\par \par [place] Hearing Assessment\par 512 Hz:\par Maher test: referred to the midline\par Rinne test:\par 	Right Ear: Air Conduction < Bone Conduction\par 	Left Ear:   Air Conduction < Bone conduction [Midline] : trachea located in midline position [Normal] : no rashes

## 2022-08-05 ENCOUNTER — APPOINTMENT (OUTPATIENT)
Dept: CT IMAGING | Facility: CLINIC | Age: 42
End: 2022-08-05

## 2022-08-05 ENCOUNTER — OUTPATIENT (OUTPATIENT)
Dept: OUTPATIENT SERVICES | Facility: HOSPITAL | Age: 42
LOS: 1 days | End: 2022-08-05

## 2022-08-05 ENCOUNTER — RESULT REVIEW (OUTPATIENT)
Age: 42
End: 2022-08-05

## 2022-08-05 DIAGNOSIS — Z41.9 ENCOUNTER FOR PROCEDURE FOR PURPOSES OTHER THAN REMEDYING HEALTH STATE, UNSPECIFIED: Chronic | ICD-10-CM

## 2022-08-05 PROCEDURE — 70480 CT ORBIT/EAR/FOSSA W/O DYE: CPT | Mod: 26

## 2022-08-22 ENCOUNTER — APPOINTMENT (OUTPATIENT)
Dept: OTOLARYNGOLOGY | Facility: CLINIC | Age: 42
End: 2022-08-22

## 2022-08-22 DIAGNOSIS — H80.13: ICD-10-CM

## 2022-08-22 PROCEDURE — 99213 OFFICE O/P EST LOW 20 MIN: CPT | Mod: 95

## 2022-08-22 NOTE — HISTORY OF PRESENT ILLNESS
[Home] : at home, [unfilled] , at the time of the visit. [Medical Office: ([facility])___] : at the medical office located in  [Verbal consent obtained from patient] : the patient, [unfilled] [de-identified] : OLIVA MAGANA has a history of hearing loss since he was 14 years old.  This has apparently worsened over time. Underwent left ear surgery in 2012 but was aborted due to a thick footplate. Using hearing aids for many years. \par Tinnitus is noted bilaterally.  No vertigo. \par FH: uncle has similar hearing loss [FreeTextEntry1] : August 22, 2022:\par Follow-up for CT imaging.  Patient reports no changes in hearing.  Using bilateral amplification.

## 2022-08-22 NOTE — CONSULT LETTER
[Please see my note below.] : Please see my note below. [DrYane  ___] : Dr. KIRK [FreeTextEntry2] : Dear MAHESH MONTOYA  [FreeTextEntry1] : Thank you for allowing me to participate in the care of OLIVA MAGANA .\par Please see the attached visit note.\par \par \par \par Pradeep Ivey\par Otology\par Medical Director of [facility]\par Department of Otolaryngology\par [facility]

## 2022-08-22 NOTE — ASSESSMENT
[FreeTextEntry1] : The CT findings were discussed with the patient in detail including the challenges it presents for surgical intervention.  The patient reports significant difficulties with communication, daily basis.  I have reviewed the considerable risk related to revision stapes surgery.  All risks limitations, complications and alternatives reviewed in detail.  Questions answered.  He wished to consider this.\par \par If he wished to proceed I proposed the following:\par Surgical plan: Left revision stapedectomy with drill-out, likely ossiculoplasty due to incus fixation.  Right hand vein graft,
